# Patient Record
Sex: MALE | Race: NATIVE HAWAIIAN OR OTHER PACIFIC ISLANDER
[De-identification: names, ages, dates, MRNs, and addresses within clinical notes are randomized per-mention and may not be internally consistent; named-entity substitution may affect disease eponyms.]

---

## 2018-09-03 ENCOUNTER — HOSPITAL ENCOUNTER (EMERGENCY)
Dept: HOSPITAL 31 - C.ER | Age: 63
Discharge: HOME | End: 2018-09-03
Payer: COMMERCIAL

## 2018-09-03 VITALS — DIASTOLIC BLOOD PRESSURE: 68 MMHG | SYSTOLIC BLOOD PRESSURE: 102 MMHG | HEART RATE: 81 BPM | TEMPERATURE: 99.6 F

## 2018-09-03 VITALS — RESPIRATION RATE: 18 BRPM

## 2018-09-03 VITALS — OXYGEN SATURATION: 97 %

## 2018-09-03 DIAGNOSIS — M10.9: Primary | ICD-10-CM

## 2018-09-03 PROCEDURE — 96374 THER/PROPH/DIAG INJ IV PUSH: CPT

## 2018-09-03 PROCEDURE — 99284 EMERGENCY DEPT VISIT MOD MDM: CPT

## 2018-09-03 NOTE — C.PDOC
History Of Present Illness


63 year old male presents to the ER with a complaint of right foot pain for the 

past 3 days. Patient has a Hx of gout and states the pain feels similar to 

prior exacerbations. He normally takes colchicine but ran out 5 days ago. 

Denies falls, injuries, fever, or sensory changes.


Time Seen by Provider: 09/03/18 05:13


Chief Complaint (Nursing): Lower Extremity Problem/Injury


History Per: Patient


History/Exam Limitations: no limitations


Onset/Duration Of Symptoms: Days


Current Symptoms Are (Timing): Still Present


Recent travel outside of the [place]: No





Past Medical History


Reviewed: Historical Data, Nursing Documentation, Vital Signs


Vital Signs: 


 Last Vital Signs











Temp  99.6 F   09/03/18 06:20


 


Pulse  81   09/03/18 06:20


 


Resp  18   09/03/18 06:20


 


BP  102/68   09/03/18 06:20


 


Pulse Ox  95   09/03/18 06:20














- Medical History


PMH: Asthma, HTN


Surgical History: No Surg Hx


Family History: States: Unknown Family Hx





- Social History


Hx Alcohol Use: Yes


Hx Substance Use: No





- Immunization History


Hx Tetanus Toxoid Vaccination: No


Hx Influenza Vaccination: Yes


Hx Pneumococcal Vaccination: Yes





Review Of Systems


Except As Marked, All Systems Reviewed And Found Negative.


Musculoskeletal: Positive for: Foot Pain





Physical Exam





- Physical Exam


Appears: Non-toxic, Other (Moderate pain)


Skin: Normal Color, Warm, Dry


Head: Atraumatic, Normacephalic


Eye(s): bilateral: Normal Inspection


Oral Mucosa: Moist


Chest: Symmetrical, No Tenderness


Cardiovascular: Rhythm Regular


Respiratory: Normal Breath Sounds, No Rales, No Rhonchi, No Wheezing


Extremity: Normal ROM (x4), No Calf Tenderness, Capillary Refill (<2 seconds), 

Other (Tenderness to palpation of proximal right foot with mild swelling and 

warm to touch)


Pulses: Left Dorsalis Pedis: Normal, Right Dorsalis Pedis: Normal


Neurological/Psych: Oriented x3, Normal Speech, Normal Motor, Normal Sensation


Gait: Steady





ED Course And Treatment


O2 Sat by Pulse Oximetry: 97 (room air)


Pulse Ox Interpretation: Normal


Progress Note: Colchicine and toradol administered.





Disposition


Counseled Patient/Family Regarding: Diagnosis, Need For Followup, Rx Given





- Disposition


Referrals: 


Marty Rivas MD [Staff Provider] - 


Disposition: HOME/ ROUTINE


Disposition Time: 06:00


Condition: STABLE


Additional Instructions: 


FOLLOW UP WITH YOUR DOCTOR IN 1-2 DAYS





USE MEDICATIONS AS NEEDED/DIRECTED





RETURN TO ER IF SYMPTOMS WORSEN


Prescriptions: 


Colchicine 0.6 mg PO DAILY #14 capsule


Indomethacin [Indocin] 50 mg PO TID PRN #15 cap


 PRN Reason: PAIN


Instructions:  Lifestyle Changes to Manage Gout, Gout (DC)


Forms:  [employer] (English)


Print Language: ENGLISH





- Clinical Impression


Clinical Impression: 


 Gout








- Scribe Statement


The provider has reviewed the documentation as recorded by the Scribtorin Roberts





All medical record entries made by the Ayahibtorin were at my direction and 

personally dictated by me. I have reviewed the chart and agree that the record 

accurately reflects my personal performance of the history, physical exam, 

medical decision making, and the department course for this patient. I have 

also personally directed, reviewed, and agree with the discharge instructions 

and disposition.